# Patient Record
Sex: MALE | Race: WHITE | NOT HISPANIC OR LATINO | Employment: FULL TIME | URBAN - METROPOLITAN AREA
[De-identification: names, ages, dates, MRNs, and addresses within clinical notes are randomized per-mention and may not be internally consistent; named-entity substitution may affect disease eponyms.]

---

## 2023-10-21 ENCOUNTER — HOSPITAL ENCOUNTER (EMERGENCY)
Facility: HOSPITAL | Age: 23
Discharge: HOME | End: 2023-10-21
Attending: EMERGENCY MEDICINE | Admitting: EMERGENCY MEDICINE
Payer: OTHER MISCELLANEOUS

## 2023-10-21 ENCOUNTER — APPOINTMENT (EMERGENCY)
Dept: RADIOLOGY | Facility: HOSPITAL | Age: 23
End: 2023-10-21

## 2023-10-21 ENCOUNTER — APPOINTMENT (EMERGENCY)
Dept: CT IMAGING | Facility: HOSPITAL | Age: 23
End: 2023-10-21

## 2023-10-21 VITALS
HEART RATE: 81 BPM | SYSTOLIC BLOOD PRESSURE: 129 MMHG | OXYGEN SATURATION: 99 % | TEMPERATURE: 98.3 F | RESPIRATION RATE: 14 BRPM | WEIGHT: 156.53 LBS | DIASTOLIC BLOOD PRESSURE: 60 MMHG

## 2023-10-21 DIAGNOSIS — S52.501A DISTAL RADIUS FRACTURE, RIGHT: ICD-10-CM
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
## 2023-10-21 DIAGNOSIS — S09.90XA CHI (CLOSED HEAD INJURY): ICD-10-CM

## 2023-10-21 DIAGNOSIS — T14.8XXA ABRASION: ICD-10-CM

## 2023-10-21 LAB
ALBUMIN SERPL BCP-MCNC: 4.9 G/DL (ref 3.5–5)
ALP SERPL-CCNC: 49 U/L (ref 34–104)
ALT SERPL W P-5'-P-CCNC: 18 U/L (ref 7–52)
ANION GAP SERPL CALCULATED.3IONS-SCNC: 10 MMOL/L
AST SERPL W P-5'-P-CCNC: 25 U/L (ref 13–39)
BASOPHILS # BLD AUTO: 0.05 THOUSANDS/ÂΜL (ref 0–0.1)
BASOPHILS NFR BLD AUTO: 0 % (ref 0–1)
BILIRUB SERPL-MCNC: 0.91 MG/DL (ref 0.2–1)
BUN SERPL-MCNC: 10 MG/DL (ref 5–25)
CALCIUM SERPL-MCNC: 9.5 MG/DL (ref 8.4–10.2)
CHLORIDE SERPL-SCNC: 101 MMOL/L (ref 96–108)
CO2 SERPL-SCNC: 26 MMOL/L (ref 21–32)
CREAT SERPL-MCNC: 0.87 MG/DL (ref 0.6–1.3)
EOSINOPHIL # BLD AUTO: 0.05 THOUSAND/ÂΜL (ref 0–0.61)
EOSINOPHIL NFR BLD AUTO: 0 % (ref 0–6)
ERYTHROCYTE [DISTWIDTH] IN BLOOD BY AUTOMATED COUNT: 13.2 % (ref 11.6–15.1)
GFR SERPL CREATININE-BSD FRML MDRD: 122 ML/MIN/1.73SQ M
GLUCOSE SERPL-MCNC: 111 MG/DL (ref 65–140)
HCT VFR BLD AUTO: 44.7 % (ref 36.5–49.3)
HGB BLD-MCNC: 15.1 G/DL (ref 12–17)
IMM GRANULOCYTES # BLD AUTO: 0.03 THOUSAND/UL (ref 0–0.2)
IMM GRANULOCYTES NFR BLD AUTO: 0 % (ref 0–2)
LYMPHOCYTES # BLD AUTO: 2.38 THOUSANDS/ÂΜL (ref 0.6–4.47)
LYMPHOCYTES NFR BLD AUTO: 20 % (ref 14–44)
MCH RBC QN AUTO: 31.4 PG (ref 26.8–34.3)
MCHC RBC AUTO-ENTMCNC: 33.8 G/DL (ref 31.4–37.4)
MCV RBC AUTO: 93 FL (ref 82–98)
MONOCYTES # BLD AUTO: 0.91 THOUSAND/ÂΜL (ref 0.17–1.22)
MONOCYTES NFR BLD AUTO: 8 % (ref 4–12)
NEUTROPHILS # BLD AUTO: 8.52 THOUSANDS/ÂΜL (ref 1.85–7.62)
NEUTS SEG NFR BLD AUTO: 72 % (ref 43–75)
NRBC BLD AUTO-RTO: 0 /100 WBCS
PLATELET # BLD AUTO: 242 THOUSANDS/UL (ref 149–390)
PMV BLD AUTO: 9.9 FL (ref 8.9–12.7)
POTASSIUM SERPL-SCNC: 3.2 MMOL/L (ref 3.5–5.3)
PROT SERPL-MCNC: 7.8 G/DL (ref 6.4–8.4)
RBC # BLD AUTO: 4.81 MILLION/UL (ref 3.88–5.62)
SODIUM SERPL-SCNC: 137 MMOL/L (ref 135–147)
WBC # BLD AUTO: 11.94 THOUSAND/UL (ref 4.31–10.16)

## 2023-10-21 PROCEDURE — 93005 ELECTROCARDIOGRAM TRACING: CPT

## 2023-10-21 PROCEDURE — 99285 EMERGENCY DEPT VISIT HI MDM: CPT | Performed by: PHYSICIAN ASSISTANT

## 2023-10-21 PROCEDURE — 70450 CT HEAD/BRAIN W/O DYE: CPT

## 2023-10-21 PROCEDURE — 71260 CT THORAX DX C+: CPT

## 2023-10-21 PROCEDURE — 72125 CT NECK SPINE W/O DYE: CPT

## 2023-10-21 PROCEDURE — 80053 COMPREHEN METABOLIC PANEL: CPT | Performed by: PHYSICIAN ASSISTANT

## 2023-10-21 PROCEDURE — 73130 X-RAY EXAM OF HAND: CPT

## 2023-10-21 PROCEDURE — 36415 COLL VENOUS BLD VENIPUNCTURE: CPT | Performed by: PHYSICIAN ASSISTANT

## 2023-10-21 PROCEDURE — 73110 X-RAY EXAM OF WRIST: CPT

## 2023-10-21 PROCEDURE — 74177 CT ABD & PELVIS W/CONTRAST: CPT

## 2023-10-21 PROCEDURE — 99284 EMERGENCY DEPT VISIT MOD MDM: CPT

## 2023-10-21 PROCEDURE — 71045 X-RAY EXAM CHEST 1 VIEW: CPT

## 2023-10-21 PROCEDURE — 29125 APPL SHORT ARM SPLINT STATIC: CPT | Performed by: PHYSICIAN ASSISTANT

## 2023-10-21 PROCEDURE — 85025 COMPLETE CBC W/AUTO DIFF WBC: CPT | Performed by: PHYSICIAN ASSISTANT

## 2023-10-21 RX ORDER — ONDANSETRON 4 MG/1
4 TABLET, ORALLY DISINTEGRATING ORAL EVERY 8 HOURS PRN
Qty: 15 TABLET | Refills: 0 | Status: SHIPPED | OUTPATIENT
Start: 2023-10-21

## 2023-10-21 RX ORDER — GINSENG 100 MG
1 CAPSULE ORAL ONCE
Status: COMPLETED | OUTPATIENT
Start: 2023-10-21 | End: 2023-10-21

## 2023-10-21 RX ORDER — ACETAMINOPHEN 325 MG/1
650 TABLET ORAL ONCE
Status: COMPLETED | OUTPATIENT
Start: 2023-10-21 | End: 2023-10-21

## 2023-10-21 RX ORDER — IBUPROFEN 600 MG/1
600 TABLET ORAL ONCE
Status: COMPLETED | OUTPATIENT
Start: 2023-10-21 | End: 2023-10-21

## 2023-10-21 RX ADMIN — BACITRACIN 1 LARGE APPLICATION: 500 OINTMENT TOPICAL at 21:33

## 2023-10-21 RX ADMIN — IOHEXOL 100 ML: 350 INJECTION, SOLUTION INTRAVENOUS at 19:31

## 2023-10-21 RX ADMIN — IBUPROFEN 600 MG: 600 TABLET ORAL at 22:03

## 2023-10-21 RX ADMIN — ACETAMINOPHEN 650 MG: 325 TABLET, FILM COATED ORAL at 22:03
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
## 2023-10-22 LAB
ATRIAL RATE: 94 BPM
P AXIS: 81 DEGREES
PR INTERVAL: 176 MS
QRS AXIS: 109 DEGREES
QRSD INTERVAL: 92 MS
QT INTERVAL: 362 MS
QTC INTERVAL: 452 MS
T WAVE AXIS: 54 DEGREES
VENTRICULAR RATE: 94 BPM

## 2023-10-22 PROCEDURE — 93010 ELECTROCARDIOGRAM REPORT: CPT | Performed by: INTERNAL MEDICINE

## 2023-10-23 ENCOUNTER — TELEPHONE (OUTPATIENT)
Age: 23
End: 2023-10-23

## 2023-10-23 VITALS
WEIGHT: 157 LBS | BODY MASS INDEX: 21.98 KG/M2 | SYSTOLIC BLOOD PRESSURE: 120 MMHG | HEIGHT: 71 IN | DIASTOLIC BLOOD PRESSURE: 76 MMHG

## 2023-10-23 DIAGNOSIS — S09.90XA CHI (CLOSED HEAD INJURY): ICD-10-CM

## 2023-10-23 PROCEDURE — 99203 OFFICE O/P NEW LOW 30 MIN: CPT | Performed by: FAMILY MEDICINE

## 2023-10-23 NOTE — PATIENT INSTRUCTIONS
General Information on Sports Concussion      AMBULATORY CARE:     A concussion  is also known as mild traumatic brain injury. It is usually caused by a bump or blow to the head. However, it may also happen without a direct blow to head through a violent sudden head and neck movement. A sports concussion happens while you are playing sports. This can happen during almost any sport, but is most common with football, hockey, and boxing. Your head may come into contact with another player, the player's equipment, or a hard surface. Even a seemingly mild blow can cause a concussion. You may lose consciousness and need help getting off the field of play. It is important to follow the return to play protocol for your sport, even if you do not lose consciousness. This may mean you cannot go back into the game. You may also not be able to play in the next several games until you heal.    Signs and symptoms of a concussion that may happen during a sports activity:     Trouble remembering what to do during the game, or not keeping up with other players    Ringing in the ears or feeling foggy    Dizziness, loss of balance, or blurry vision    Nausea or vomiting    Sensitivity to light    Common signs and symptoms of a concussion:  Some signs and symptoms may occur right away, or may develop days after the concussion:  A mild to moderate headache    Trouble thinking, remembering things, or concentrating    Drowsiness or decreased energy    Changes in your normal sleeping pattern    A change in mood, such as restlessness or irritability    Have someone call 911 for any of the following: You cannot be woken. You have a seizure, increasing confusion, or a change in personality. Your speech becomes slurred. Seek care immediately if:     You have sudden and new vision problems. You have a severe headache that does not go away. You do not recognize people or places that should be familiar to you.     You have arm or leg weakness, numbness, or new problems with coordination. You have blood or clear fluid coming out of your ears or nose. Contact your healthcare provider if:     You have nausea or are vomiting. You feel more sleepy than usual.    Your symptoms get worse. You have questions or concerns about your condition or care. A return to play protocol  is a procedure to decide if it is safe to return to a sports event after a suspected concussion. Healthcare providers who are trained in sports medicine need to examine players who have a blow to the head. They look for certain signs, such as confusion, dizziness, and nausea. These signs may mean a concussion happened and it would be dangerous to return to the game. Another concussion could cause a condition called second impact syndrome. This means you have another concussion before you have recovered from the first. Second impact syndrome can be life-threatening. Manage or prevent a sports concussion:  Usually no treatment is needed for a mild concussion. Concussion symptoms typically resolve in 3-4 weeks in children and 2-3 weeks in adults, but they may last longer. The following may be recommended to manage your symptoms:    Have someone stay with you for the first 24 hours after your injury. Your healthcare provider should be contacted if your symptoms get worse, or you develop new symptoms. Rest from physical and mental activities as directed. Mental activities are those that require thinking, concentration, and attention. You may need to rest until your symptoms improve. However, a prolonged period of  absence from school or academic activity has not shown to have any significant improvement in the recovery time frame of a concussion injury. His symptoms are significant, academic activity modification and physical activity modification may be suggested.   In most cases, light aerobic non contact physical activity is encouraged in the early days following concussion, as long as there is no symptom worsening. Ask your healthcare provider when you can return to work and other daily activities. Create a sleep schedule. Sleep is an important part of recovery from a concussion. Your healthcare provider will talk to you about how much sleep is right for you. You may find that you are sleeping more than usual or less than usual after your concussion. This should get better over time as you heal. A sleep schedule can help make sure you are getting the right amount of sleep. Try to go to sleep and wake up at the same times each day. Do not use electronic devices or watch TV an hour before you go to sleep. These screens may make it harder to go to sleep or to stay asleep. Keep a record of how much you sleep each night. Bring the record to follow-up visits with your healthcare providers. Do not participate in sports or physical activities until your healthcare provider says it is okay. In most cases, light aerobic non contact physical activity is encouraged in the early days following concussion, as long as there is no symptom worsening. High intensity or contact sports and physical activities could make your symptoms worse or lead to another concussion. Each concussion you have can build on the others and cause more damage. Wear protective sports equipment that fits properly. Helmets help decrease your risk of a serious brain injury. Talk to your healthcare provider about ways you can decrease your risk for a concussion. Acetaminophen  decreases pain and fever. It is available without a doctor's order. Ask how much to take and how often to take it. Follow directions. Read the labels of all other medicines you are using to see if they also contain acetaminophen, or ask your doctor or pharmacist. Acetaminophen can cause liver damage if not taken correctly. Do not use more than 3 grams (3,000 milligrams) total of acetaminophen in one day.      NSAIDs help decrease swelling and pain or fever. This medicine is available with or without a doctor's order. Avoid taking NSAIDs  or Aspirin in the initial 72 hours following a concussion injury. NSAIDs can cause stomach bleeding or kidney problems in certain people. If you take blood thinner medicine, always ask your healthcare provider if NSAIDs are safe for you. Always read the medicine label and follow directions. Follow up with your healthcare provider as directed:  Write down your questions so you remember to ask them during your visits. © Copyright [employer] 2021 Information is for End User's use only and may not be sold, redistributed or otherwise used for commercial purposes. All illustrations and images included in CareNotes® are the copyrighted property of [employer] or [address] [place] [address]  The above information is an  only. It is not intended as medical advice for individual conditions or treatments. Talk to your doctor, nurse or pharmacist before following any medical regimen to see if it is safe and effective for you.
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
## 2023-10-23 NOTE — TELEPHONE ENCOUNTER
Patient is being referred to a orthopedics. Please schedule accordingly.     [facility]   [phone]

## 2023-10-24 NOTE — ED PROCEDURE NOTE
Procedure  Splint application    Date/Time: 10/21/2023 10:00 PM    Performed by: Nadine Bergeron PA-C  Authorized by: Nadine Bergeron PA-C  Universal Protocol:  Consent: Verbal consent obtained. Risks and benefits: risks, benefits and alternatives were discussed  Consent given by: patient  Patient understanding: patient states understanding of the procedure being performed  Patient identity confirmed: verbally with patient, arm band and provided demographic data    Procedure details:     Splint type:  Volar short arm  Post-procedure details:     Pain:  Improved    Sensation:  Normal    Patient tolerance of procedure: Tolerated well, no immediate complications  Comments:      NVI after splint application.                    Nadine Bergeron PA-C  10/24/23 1527

## 2023-11-06 ENCOUNTER — OFFICE VISIT (OUTPATIENT)
Dept: OBGYN CLINIC | Facility: CLINIC | Age: 23
End: 2023-11-06
Payer: COMMERCIAL

## 2023-11-06 VITALS
SYSTOLIC BLOOD PRESSURE: 110 MMHG | WEIGHT: 151 LBS | HEIGHT: 71 IN | BODY MASS INDEX: 21.14 KG/M2 | DIASTOLIC BLOOD PRESSURE: 68 MMHG

## 2023-11-06 DIAGNOSIS — S06.0X9D CONCUSSION WITH LOSS OF CONSCIOUSNESS, SUBSEQUENT ENCOUNTER: ICD-10-CM

## 2023-11-06 DIAGNOSIS — S09.90XD CLOSED HEAD INJURY, SUBSEQUENT ENCOUNTER: Primary | ICD-10-CM

## 2023-11-06 PROCEDURE — 99214 OFFICE O/P EST MOD 30 MIN: CPT | Performed by: FAMILY MEDICINE

## 2023-11-06 NOTE — PATIENT INSTRUCTIONS
General Information on Sports Concussion      AMBULATORY CARE:     A concussion  is also known as mild traumatic brain injury. It is usually caused by a bump or blow to the head. However, it may also happen without a direct blow to head through a violent sudden head and neck movement. A sports concussion happens while you are playing sports. This can happen during almost any sport, but is most common with football, hockey, and boxing. Your head may come into contact with another player, the player's equipment, or a hard surface. Even a seemingly mild blow can cause a concussion. You may lose consciousness and need help getting off the field of play. It is important to follow the return to play protocol for your sport, even if you do not lose consciousness. This may mean you cannot go back into the game. You may also not be able to play in the next several games until you heal.    Signs and symptoms of a concussion that may happen during a sports activity:     Trouble remembering what to do during the game, or not keeping up with other players    Ringing in the ears or feeling foggy    Dizziness, loss of balance, or blurry vision    Nausea or vomiting    Sensitivity to light    Common signs and symptoms of a concussion:  Some signs and symptoms may occur right away, or may develop days after the concussion:  A mild to moderate headache    Trouble thinking, remembering things, or concentrating    Drowsiness or decreased energy    Changes in your normal sleeping pattern    A change in mood, such as restlessness or irritability    Have someone call 911 for any of the following: You cannot be woken. You have a seizure, increasing confusion, or a change in personality. Your speech becomes slurred. Seek care immediately if:     You have sudden and new vision problems. You have a severe headache that does not go away. You do not recognize people or places that should be familiar to you.     You have arm or leg weakness, numbness, or new problems with coordination. You have blood or clear fluid coming out of your ears or nose. Contact your healthcare provider if:     You have nausea or are vomiting. You feel more sleepy than usual.    Your symptoms get worse. You have questions or concerns about your condition or care. A return to play protocol  is a procedure to decide if it is safe to return to a sports event after a suspected concussion. Healthcare providers who are trained in sports medicine need to examine players who have a blow to the head. They look for certain signs, such as confusion, dizziness, and nausea. These signs may mean a concussion happened and it would be dangerous to return to the game. Another concussion could cause a condition called second impact syndrome. This means you have another concussion before you have recovered from the first. Second impact syndrome can be life-threatening. Manage or prevent a sports concussion:  Usually no treatment is needed for a mild concussion. Concussion symptoms typically resolve in 3-4 weeks in children and 2-3 weeks in adults, but they may last longer. The following may be recommended to manage your symptoms:    Have someone stay with you for the first 24 hours after your injury. Your healthcare provider should be contacted if your symptoms get worse, or you develop new symptoms. Rest from physical and mental activities as directed. Mental activities are those that require thinking, concentration, and attention. You may need to rest until your symptoms improve. However, a prolonged period of  absence from school or academic activity has not shown to have any significant improvement in the recovery time frame of a concussion injury. His symptoms are significant, academic activity modification and physical activity modification may be suggested.   In most cases, light aerobic non contact physical activity is encouraged in the early days following concussion, as long as there is no symptom worsening. Ask your healthcare provider when you can return to work and other daily activities. Create a sleep schedule. Sleep is an important part of recovery from a concussion. Your healthcare provider will talk to you about how much sleep is right for you. You may find that you are sleeping more than usual or less than usual after your concussion. This should get better over time as you heal. A sleep schedule can help make sure you are getting the right amount of sleep. Try to go to sleep and wake up at the same times each day. Do not use electronic devices or watch TV an hour before you go to sleep. These screens may make it harder to go to sleep or to stay asleep. Keep a record of how much you sleep each night. Bring the record to follow-up visits with your healthcare providers. Do not participate in sports or physical activities until your healthcare provider says it is okay. In most cases, light aerobic non contact physical activity is encouraged in the early days following concussion, as long as there is no symptom worsening. High intensity or contact sports and physical activities could make your symptoms worse or lead to another concussion. Each concussion you have can build on the others and cause more damage. Wear protective sports equipment that fits properly. Helmets help decrease your risk of a serious brain injury. Talk to your healthcare provider about ways you can decrease your risk for a concussion. Acetaminophen  decreases pain and fever. It is available without a doctor's order. Ask how much to take and how often to take it. Follow directions. Read the labels of all other medicines you are using to see if they also contain acetaminophen, or ask your doctor or pharmacist. Acetaminophen can cause liver damage if not taken correctly. Do not use more than 3 grams (3,000 milligrams) total of acetaminophen in one day.      NSAIDs help decrease swelling and pain or fever. This medicine is available with or without a doctor's order. Avoid taking NSAIDs  or Aspirin in the initial 72 hours following a concussion injury. NSAIDs can cause stomach bleeding or kidney problems in certain people. If you take blood thinner medicine, always ask your healthcare provider if NSAIDs are safe for you. Always read the medicine label and follow directions. Follow up with your healthcare provider as directed:  Write down your questions so you remember to ask them during your visits. © Copyright [employer] 2021 Information is for End User's use only and may not be sold, redistributed or otherwise used for commercial purposes. All illustrations and images included in CareNotes® are the copyrighted property of [employer] or [address] [place] [address]  The above information is an  only. It is not intended as medical advice for individual conditions or treatments. Talk to your doctor, nurse or pharmacist before following any medical regimen to see if it is safe and effective for you.
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
## 2023-11-21 ENCOUNTER — OFFICE VISIT (OUTPATIENT)
Dept: OBGYN CLINIC | Facility: CLINIC | Age: 23
End: 2023-11-21

## 2023-11-21 VITALS
BODY MASS INDEX: 22.4 KG/M2 | SYSTOLIC BLOOD PRESSURE: 100 MMHG | DIASTOLIC BLOOD PRESSURE: 68 MMHG | WEIGHT: 160 LBS | HEIGHT: 71 IN

## 2023-11-21 DIAGNOSIS — S09.90XD CLOSED HEAD INJURY, SUBSEQUENT ENCOUNTER: ICD-10-CM

## 2023-11-21 DIAGNOSIS — S06.0X9D CONCUSSION WITH LOSS OF CONSCIOUSNESS, SUBSEQUENT ENCOUNTER: Primary | ICD-10-CM

## 2023-11-21 PROCEDURE — 99214 OFFICE O/P EST MOD 30 MIN: CPT | Performed by: FAMILY MEDICINE
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
## 2023-11-21 NOTE — PATIENT INSTRUCTIONS
General Information on Sports Concussion      AMBULATORY CARE:     A concussion  is also known as mild traumatic brain injury. It is usually caused by a bump or blow to the head. However, it may also happen without a direct blow to head through a violent sudden head and neck movement. A sports concussion happens while you are playing sports. This can happen during almost any sport, but is most common with football, hockey, and boxing. Your head may come into contact with another player, the player's equipment, or a hard surface. Even a seemingly mild blow can cause a concussion. You may lose consciousness and need help getting off the field of play. It is important to follow the return to play protocol for your sport, even if you do not lose consciousness. This may mean you cannot go back into the game. You may also not be able to play in the next several games until you heal.    Signs and symptoms of a concussion that may happen during a sports activity:     Trouble remembering what to do during the game, or not keeping up with other players    Ringing in the ears or feeling foggy    Dizziness, loss of balance, or blurry vision    Nausea or vomiting    Sensitivity to light    Common signs and symptoms of a concussion:  Some signs and symptoms may occur right away, or may develop days after the concussion:  A mild to moderate headache    Trouble thinking, remembering things, or concentrating    Drowsiness or decreased energy    Changes in your normal sleeping pattern    A change in mood, such as restlessness or irritability    Have someone call 911 for any of the following: You cannot be woken. You have a seizure, increasing confusion, or a change in personality. Your speech becomes slurred. Seek care immediately if:     You have sudden and new vision problems. You have a severe headache that does not go away. You do not recognize people or places that should be familiar to you.     You have arm or leg weakness, numbness, or new problems with coordination. You have blood or clear fluid coming out of your ears or nose. Contact your healthcare provider if:     You have nausea or are vomiting. You feel more sleepy than usual.    Your symptoms get worse. You have questions or concerns about your condition or care. A return to play protocol  is a procedure to decide if it is safe to return to a sports event after a suspected concussion. Healthcare providers who are trained in sports medicine need to examine players who have a blow to the head. They look for certain signs, such as confusion, dizziness, and nausea. These signs may mean a concussion happened and it would be dangerous to return to the game. Another concussion could cause a condition called second impact syndrome. This means you have another concussion before you have recovered from the first. Second impact syndrome can be life-threatening. Manage or prevent a sports concussion:  Usually no treatment is needed for a mild concussion. Concussion symptoms typically resolve in 3-4 weeks in children and 2-3 weeks in adults, but they may last longer. The following may be recommended to manage your symptoms:    Have someone stay with you for the first 24 hours after your injury. Your healthcare provider should be contacted if your symptoms get worse, or you develop new symptoms. Rest from physical and mental activities as directed. Mental activities are those that require thinking, concentration, and attention. You may need to rest until your symptoms improve. However, a prolonged period of  absence from school or academic activity has not shown to have any significant improvement in the recovery time frame of a concussion injury. His symptoms are significant, academic activity modification and physical activity modification may be suggested.   In most cases, light aerobic non contact physical activity is encouraged in the early days following concussion, as long as there is no symptom worsening. Ask your healthcare provider when you can return to work and other daily activities. Create a sleep schedule. Sleep is an important part of recovery from a concussion. Your healthcare provider will talk to you about how much sleep is right for you. You may find that you are sleeping more than usual or less than usual after your concussion. This should get better over time as you heal. A sleep schedule can help make sure you are getting the right amount of sleep. Try to go to sleep and wake up at the same times each day. Do not use electronic devices or watch TV an hour before you go to sleep. These screens may make it harder to go to sleep or to stay asleep. Keep a record of how much you sleep each night. Bring the record to follow-up visits with your healthcare providers. Do not participate in sports or physical activities until your healthcare provider says it is okay. In most cases, light aerobic non contact physical activity is encouraged in the early days following concussion, as long as there is no symptom worsening. High intensity or contact sports and physical activities could make your symptoms worse or lead to another concussion. Each concussion you have can build on the others and cause more damage. Wear protective sports equipment that fits properly. Helmets help decrease your risk of a serious brain injury. Talk to your healthcare provider about ways you can decrease your risk for a concussion. Acetaminophen  decreases pain and fever. It is available without a doctor's order. Ask how much to take and how often to take it. Follow directions. Read the labels of all other medicines you are using to see if they also contain acetaminophen, or ask your doctor or pharmacist. Acetaminophen can cause liver damage if not taken correctly. Do not use more than 3 grams (3,000 milligrams) total of acetaminophen in one day.      NSAIDs help decrease swelling and pain or fever. This medicine is available with or without a doctor's order. Avoid taking NSAIDs  or Aspirin in the initial 72 hours following a concussion injury. NSAIDs can cause stomach bleeding or kidney problems in certain people. If you take blood thinner medicine, always ask your healthcare provider if NSAIDs are safe for you. Always read the medicine label and follow directions. Follow up with your healthcare provider as directed:  Write down your questions so you remember to ask them during your visits. © Copyright [employer] 2021 Information is for End User's use only and may not be sold, redistributed or otherwise used for commercial purposes. All illustrations and images included in CareNotes® are the copyrighted property of [employer] or [address] [place] [address]  The above information is an  only. It is not intended as medical advice for individual conditions or treatments. Talk to your doctor, nurse or pharmacist before following any medical regimen to see if it is safe and effective for you.

## 2023-11-21 NOTE — LETTER
Academic / Physical School Note &/or Note to Certified Athletic Trainer    November 21, 2023    Patient: Mayito Barker  YOB: 2000  Age:  26 y.o. Date of visit: 11/21/2023    The above patient was seen in our office recently.   Due to a head injury we recommend:      Educational Accommodations / Benna Lucas    The following instructions that are checked apply for this patient:  Area  Requested Accommodations Comments / Clarifications   Attendance  No School      Partial School Day as tolerated by student - emphasis on core subject work      Whotever Day as tolerated by student      Water bottle in class/snack every 3-4 hours          Breaks  If symptoms appear/worsen during class, allow student to go to quite area or nurse's office; if no improvement after 30 minutes allow dismissal to home      Mandatory Breaks:       Allow breaks during day as deemed necessary by student or teachers/school personnel          Visual Stimulus  Enlarged print (18 font) copies of textbook material/ assignments      Pre-printed notes (18 font) or  for class material      Limited computer, TV screen, Bright screen use      Allow handwritten assignments (as opposed to typed on a computer)      Reduce brightness on monitors/screens      Change classroom seating to front of room as necessary      Allow student to Wear sunglasses/hat in school; seat student away from windows and bright lights          Auditory stimulus  Avoid loud classroom activities      Lunch in a quiet place with a friend, if needed      Avoid loud classes/places (I.e. music, band, choir, shop class, gym and cafeteria)      Allow student to use earplugs as needed      Allow class transitions before the bell          School work  Goldie Mensah tasks (I.e. 3 step instructions)      Short Break (5 minutes) between tasks      Reduce overall amount of in-class work      [employer] workload (only core or important tasks)/eliminate non-essential work      No homework      Reduce amount of nightly homework      Will attempt homework, but will stop if symptoms occur      Extra tutoring/assistance requested      May begin make up of essential work          Testing  No testing      Additional time for testing/untimed testing      Alternative testing methods: Oral delivery of questions, oral response or scribe      No more than one test a day      No standardized testing          Educational plan  Student is in need of an IEP and/or 504 plan (for prolonged symptoms lasting more than 3 months, if interfering with academic performance)          Physical activity  No physical exertion/athletics/gym/recess      Light aerobic non-contact physical activity as tolerated      May begin return to play        Physical Activity / Return-To-Play Protocol    The following instructions that are checked apply for this patient:   Only participate at activity level indicated in the table below. May progress through RTP up to step 4. Please see table below. Please inform regarding progression / symptoms after reaching Step 4. Graded concussion Return to Play protocol. Please see table below:        1)  Symptom limited activity - daily activities that do not exacerbate symptoms (e.g. walking) Target Heart Rate: 30-40% of maximum exertion e.g. slow walking or stationary bike (15 minutes)    2) Aerobic exercise    2A - Light (up to approximately 55% maxHR) then    2B - Moderate (up to approximately 70% maxHR)   Stationary cycling or walking at slow to medium pace. May start light resistance training that does not result in symptom exacerbation      3)  Individual sport-specific exercise  Note: If sport-specific training involves any risk of inadvertent head impact, medical clearance should occur prior to step 3 Sport specific training away from team environment (eg, running, change of direction and/or individual training drills away from team environment).  No activities at risk of head impact. Steps 4-6 should begin after the resolution of any symptoms, abnormalities in cognitive function and any other clinical findings related to the current concussion, including with and after physical exertion. Administer  neurocognitive test if indicated and inform treating physician/ upload test results for review. 4) Non-contact training drills Exercise to high intensity including more challenging training drills (eg passing drills, multiplayer training) can integrate into a team environment. 5) Full contact practice Participate in normal training activities    6) Return to sport Normal game play     ** If symptoms occur at any level, drop back to prior level. **      Patient to return to our office:  only if symptoms return     Patient fully understands and verbally agrees with the above mentioned instructions.     Please contact our office with any questions at:  [phone]     Sincerely,    Lucrecia Dempsey, DO    No Recipients

## 2024-09-15 ENCOUNTER — APPOINTMENT (EMERGENCY)
Dept: RADIOLOGY | Facility: HOSPITAL | Age: 24
End: 2024-09-15
Payer: OTHER MISCELLANEOUS

## 2024-09-15 ENCOUNTER — HOSPITAL ENCOUNTER (EMERGENCY)
Facility: HOSPITAL | Age: 24
Discharge: HOME | End: 2024-09-15
Attending: EMERGENCY MEDICINE | Admitting: EMERGENCY MEDICINE
Payer: OTHER MISCELLANEOUS

## 2024-09-15 VITALS
HEART RATE: 77 BPM | DIASTOLIC BLOOD PRESSURE: 74 MMHG | SYSTOLIC BLOOD PRESSURE: 125 MMHG | TEMPERATURE: 96.7 F | OXYGEN SATURATION: 100 % | RESPIRATION RATE: 18 BRPM | BODY MASS INDEX: 21.59 KG/M2 | WEIGHT: 154.76 LBS

## 2024-09-15 DIAGNOSIS — S43.101A SEPARATION OF RIGHT ACROMIOCLAVICULAR JOINT, INITIAL ENCOUNTER: Primary | ICD-10-CM

## 2024-09-15 PROCEDURE — 99283 EMERGENCY DEPT VISIT LOW MDM: CPT

## 2024-09-15 PROCEDURE — 73030 X-RAY EXAM OF SHOULDER: CPT

## 2024-09-15 PROCEDURE — 99284 EMERGENCY DEPT VISIT MOD MDM: CPT | Performed by: EMERGENCY MEDICINE

## 2024-09-15 NOTE — ED PROVIDER NOTES
"Pt Name: Vinicio Moreno  MRN: [id number]  Birthdate 2000  Age/Sex: 24 y.o. male  Date of evaluation: 9/15/2024  PCP: No primary care provider on file.    CHIEF COMPLAINT    Chief Complaint   Patient presents with    Shoulder Pain     Pt presents to the ED R shoulder pain. Pt stated around 12 today he hurt it at work \"helping a rider down off of a horse\"         HPI and MDM    24 y.o. male presenting with right shoulder pain.  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  And he started having right shoulder pain.  This was around noon.  He is left-handed.  No numbness or tingling or weakness.  Pain with raising right arm above head.  No other injuries.      Differential diagnosis considered includes but not limited to fracture, dislocation, A-C dissociation, sprain, strain.    Per my independent interpretation of right shoulder x-ray, there is an AC dissociation.  No obvious fracture noted per my independent interpretation.  Patient updated results.  Placed in sling.  Advised orthopedic follow-up, return precautions discussed.                Medications - No data to display      Past Medical and Surgical History    No past medical history on file.    No past surgical history on file.    No family history on file.    Social History     Tobacco Use    Smoking status: Every Day     Types: Cigarettes    Smokeless tobacco: Never   Substance Use Topics    Alcohol use: Yes    Drug use: Never           Allergies    No Known Allergies    Home Medications    Prior to Admission medications    Medication Sig Start Date End Date Taking? Authorizing Provider   ondansetron (ZOFRAN-ODT) 4 mg disintegrating tablet Take 1 tablet (4 mg total) by mouth every 8 (eight) hours as needed for nausea  Patient not taking: Reported on 10/23/2023 10/21/23   Srinivasa Chiu PA-C           Physical Exam      ED Triage Vitals " [09/15/24 1726]   Temperature Pulse Respirations Blood Pressure SpO2   (!) 96.7 °F (35.9 °C) 77 18 125/74 100 %      Temp Source Heart Rate Source Patient Position - Orthostatic VS BP Location FiO2 (%)   Temporal -- -- -- --      Pain Score       --               Physical Exam  Constitutional:       General: He is not in acute distress.  HENT:      Head: Normocephalic.      Nose: Nose normal.      Mouth/Throat:      Mouth: Mucous membranes are moist.   Eyes:      Conjunctiva/sclera: Conjunctivae normal.   Cardiovascular:      Rate and Rhythm: Normal rate.      Comments: R radial pulse in tact  Pulmonary:      Effort: Pulmonary effort is normal. No respiratory distress.   Abdominal:      General: There is no distension.   Musculoskeletal:      Cervical back: Normal range of motion.      Comments: R AC ttp, pain with raising RUE above head.    Neurological:      Mental Status: He is alert and oriented to person, place, and time.      Sensory: No sensory deficit.      Motor: No weakness.              Diagnostic Results      Labs:    Results Reviewed       None            All labs reviewed and utilized in the medical decision making process    Radiology:    XR shoulder 2+ views RIGHT    (Results Pending)       All radiology studies independently viewed by me and interpreted by the radiologist.    Procedure    Procedures        FINAL IMPRESSION    Final diagnoses:   Separation of right acromioclavicular joint, initial encounter         DISPOSITION    Time reflects when diagnosis was documented in both MDM as applicable and the Disposition within this note       Time User Action Codes Description Comment    9/15/2024  6:28 PM Mino Turpin Add [S43.101A] Separation of right acromioclavicular joint, initial encounter           ED Disposition       ED Disposition   Discharge    Condition   Stable    Date/Time   Sun Sep 15, 2024  6:28 PM    Comment   Vinicio Moreno discharge to home/self care.                   Follow-up  Information       Follow up With Specialties Details Why Contact Info Additional Information    [facility] [place] [facility] Call in 1 day  [address]  [facility] [address] [phone] [facility] [place], [address], [place], [place], [address] [phone]              PATIENT REFERRED TO:    [facility] [place]  [address]  [facility] [address] [phone]  Call in 1 day        DISCHARGE MEDICATIONS:    Discharge Medication List as of 9/15/2024  6:30 PM        CONTINUE these medications which have NOT CHANGED    Details   ondansetron (ZOFRAN-ODT) 4 mg disintegrating tablet Take 1 tablet (4 mg total) by mouth every 8 (eight) hours as needed for nausea, Starting Sat 10/21/2023, Print                      Mino Turpin DO        This note was partially completed using voice recognition technology, and was scanned for gross errors; however some errors may still exist. Please contact the author with any questions or requests for clarification.      Mino Turpin DO  09/15/24 [id number]

## 2024-09-15 NOTE — Clinical Note
Vinicio Moreno was seen and treated in our emergency department on 9/15/2024.                Diagnosis:     Vinicio  may return to work on return date.    He may return on this date: 09/16/2024    However, patient should not bear any weight with his right arm until seen by orthopedics.     If you have any questions or concerns, please don't hesitate to call.      Mino Turpin, DO    ______________________________           _______________          _______________  Hospital Representative                              Date                                Time

## 2024-09-26 VITALS
OXYGEN SATURATION: 99 % | HEART RATE: 77 BPM | BODY MASS INDEX: 21.7 KG/M2 | RESPIRATION RATE: 18 BRPM | WEIGHT: 155 LBS | HEIGHT: 71 IN | DIASTOLIC BLOOD PRESSURE: 79 MMHG | SYSTOLIC BLOOD PRESSURE: 116 MMHG

## 2024-09-26 DIAGNOSIS — S43.101A SEPARATION OF RIGHT ACROMIOCLAVICULAR JOINT, INITIAL ENCOUNTER: ICD-10-CM

## 2024-09-26 PROCEDURE — 99213 OFFICE O/P EST LOW 20 MIN: CPT | Performed by: STUDENT IN AN ORGANIZED HEALTH CARE EDUCATION/TRAINING PROGRAM

## 2024-09-26 NOTE — LETTER
September 26, 2024     Patient: Vinicio Moreno  YOB: 2000  Date of Visit: 9/26/2024      To Whom it May Concern:    Vinicio Moreno is under my professional care. Vinicio was seen in my office on 9/26/2024. Vinicio may return to work on 9/26/2024 with no restrictions .    If you have any questions or concerns, please don't hesitate to call.         Sincerely,          Grant Carcamo MD        CC: No Recipients

## 2024-09-26 NOTE — PROGRESS NOTES
Patient Name:  Vinicio Moreno  MRN:  [id number]    Assessment & Plan     1. Separation of right acromioclavicular joint, initial encounter  -     Ambulatory Referral to Orthopedic Surgery      Right shoulder ac joint sprain  X-rays were reviewed in office today with patient  Discussed with patient he has excellent ROM and strength, with no significant pain about his shoulder and deformity. He likely had a mild AC sprain. He is cleared to return to work with no restrictions.   Patient can also continue participating in [employer] with no restrictions or need for bracing.  We did discuss oral and topical medication adjuncts to help with any discomfort, but given his shoulder hasn't had much discomfort, he does not feel that he needs them currently.   Follow up PRN     Chief Complaint     Right shoulder pain    History of the Present Illness     Vinicio Moreno is a LHD 24 y.o. male with Right shoulder pain that started on 9/15/2024 while helping a rider down off a horse at [employer]. He works as a . She put all her weight on his right shoulder . 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 He was seen in the ED same day of injury, x-rays were taken patient was told he has an AC joint seperation, placed in a sling and referred to an orthopedic. Patient reports today with no complaints of pain he feels he can move and use his shoulder with no issues. Feels that he'd be able to return back to all work and recreational activities safely. He denies numbness or tingling in his fingers or other injury.     Review of Systems     Review of Systems   Constitutional:  Negative for chills and fever.   HENT:  Negative for ear pain and sore throat.    Eyes:  Negative for pain and visual disturbance.   Respiratory:  Negative for cough and shortness of breath.    Cardiovascular:  Negative for chest pain and palpitations.   Gastrointestinal:  Negative for abdominal pain and  "vomiting.   Genitourinary:  Negative for dysuria and hematuria.   Musculoskeletal:  Positive for arthralgias. Negative for back pain.   Skin:  Negative for color change and rash.   Neurological:  Negative for seizures and syncope.   All other systems reviewed and are negative.      Physical Exam     /79   Pulse 77   Resp 18   Ht 5' 11\" (1.803 m)   Wt 70.3 kg (155 lb)   SpO2 99%   BMI 21.62 kg/m²     Right Shoulder:   Active range of motion   180 degrees forward flexion  70 degrees external rotation 5/5 strength  Upper thoracic spine internal rotation    Negative lift off  Negative belly press  Passive range of motion   There is minimal tenderness present over the anterior joint and lateral acromion. .   Montes De Oca test is negative   Macon's test is negative    Speed's test is Negative  Negative cross body abduction  The patient is neurovascularly intact distally in the extremity.      Eyes:  Anicteric sclerae.  Neck:  Supple.  Lungs:  Normal respiratory effort.  Cardiovascular:  Capillary refill is less than 2 seconds.  Skin:  Intact without erythema.  Neurologic:  Sensation grossly intact to light touch.  Psychiatric:  Mood and affect are appropriate.    Data Review     I have personally reviewed pertinent films in PACS, and my interpretation follows:    X-rays taken 9/15/2024 of [facility] demonstrate no evidence of a fracture or dislocation. Adequate joint space with no evidence of AC arthrosis or significant asymmetry.    History reviewed. No pertinent past medical history.    History reviewed. No pertinent surgical history.    No Known Allergies    Current Outpatient Medications on File Prior to Visit   Medication Sig Dispense Refill    ondansetron (ZOFRAN-ODT) 4 mg disintegrating tablet Take 1 tablet (4 mg total) by mouth every 8 (eight) hours as needed for nausea (Patient not taking: Reported on 10/23/2023) 15 tablet 0     No current facility-administered medications on file prior to visit. "       Social History     Tobacco Use    Smoking status: Every Day     Types: Cigarettes    Smokeless tobacco: Never   Substance Use Topics    Alcohol use: Yes    Drug use: Never       History reviewed. No pertinent family history.        Scribe Attestation      I,:  Tita Lazaro am acting as a scribe while in the presence of the attending physician.:       I,:  Grant Carcamo MD personally performed the services described in this documentation    as scribed in my presence.: